# Patient Record
Sex: FEMALE | URBAN - METROPOLITAN AREA
[De-identification: names, ages, dates, MRNs, and addresses within clinical notes are randomized per-mention and may not be internally consistent; named-entity substitution may affect disease eponyms.]

---

## 2022-10-11 ENCOUNTER — WEB ENCOUNTER (OUTPATIENT)
Dept: URBAN - METROPOLITAN AREA CLINIC 96 | Facility: CLINIC | Age: 27
End: 2022-10-11

## 2022-10-12 ENCOUNTER — OFFICE VISIT (OUTPATIENT)
Dept: URBAN - METROPOLITAN AREA CLINIC 96 | Facility: CLINIC | Age: 27
End: 2022-10-12
Payer: COMMERCIAL

## 2022-10-12 ENCOUNTER — DASHBOARD ENCOUNTERS (OUTPATIENT)
Age: 27
End: 2022-10-12

## 2022-10-12 ENCOUNTER — LAB OUTSIDE AN ENCOUNTER (OUTPATIENT)
Dept: URBAN - METROPOLITAN AREA CLINIC 96 | Facility: CLINIC | Age: 27
End: 2022-10-12

## 2022-10-12 VITALS
TEMPERATURE: 98.3 F | SYSTOLIC BLOOD PRESSURE: 142 MMHG | BODY MASS INDEX: 44.41 KG/M2 | HEIGHT: 68 IN | HEART RATE: 101 BPM | DIASTOLIC BLOOD PRESSURE: 99 MMHG | WEIGHT: 293 LBS

## 2022-10-12 DIAGNOSIS — R11.2 NAUSEA & VOMITING: ICD-10-CM

## 2022-10-12 DIAGNOSIS — K21.9 GERD (GASTROESOPHAGEAL REFLUX DISEASE): ICD-10-CM

## 2022-10-12 DIAGNOSIS — R13.10 DYSPHAGIA: ICD-10-CM

## 2022-10-12 PROBLEM — 40739000 DYSPHAGIA: Status: ACTIVE | Noted: 2022-10-12

## 2022-10-12 PROBLEM — 235595009 GASTROESOPHAGEAL REFLUX DISEASE: Status: ACTIVE | Noted: 2022-10-12

## 2022-10-12 PROCEDURE — 99204 OFFICE O/P NEW MOD 45 MIN: CPT | Performed by: INTERNAL MEDICINE

## 2022-10-12 RX ORDER — PANTOPRAZOLE SODIUM 40 MG/1
1 TABLET TABLET, DELAYED RELEASE ORAL ONCE A DAY
Qty: 30 TABLET | Refills: 11 | OUTPATIENT
Start: 2022-10-12

## 2022-10-12 NOTE — HPI-TODAY'S VISIT:
Pt is referred by Chao . Today on 10/12/2022, pt reports that she is here for sxs of GERD.  She has chronic burning sensation in epigastric and abdominal area.  She has some dysphagia sxs to certain foods (greasy or heavy foods).  Her heartburn has been present for several years, controlled on ranitidine (stopped due recall).  She then changed to zantac.  She then started on protonix since July, which has nearly resolved her sxs.  Rare aleve.  Occasional nausea.  Some diarrhea with her metformin.  Trulicity can cause nausea with increased salivation.

## 2025-04-29 ENCOUNTER — LAB OUTSIDE AN ENCOUNTER (OUTPATIENT)
Dept: URBAN - METROPOLITAN AREA CLINIC 96 | Facility: CLINIC | Age: 30
End: 2025-04-29

## 2025-04-29 ENCOUNTER — OFFICE VISIT (OUTPATIENT)
Dept: URBAN - METROPOLITAN AREA CLINIC 96 | Facility: CLINIC | Age: 30
End: 2025-04-29
Payer: COMMERCIAL

## 2025-04-29 DIAGNOSIS — K21.9 CHRONIC GERD: ICD-10-CM

## 2025-04-29 DIAGNOSIS — R19.7 DIARRHEA IN ADULT PATIENT: ICD-10-CM

## 2025-04-29 DIAGNOSIS — R11.2 NAUSEA & VOMITING: ICD-10-CM

## 2025-04-29 DIAGNOSIS — K21.9 GERD (GASTROESOPHAGEAL REFLUX DISEASE): ICD-10-CM

## 2025-04-29 PROCEDURE — 99214 OFFICE O/P EST MOD 30 MIN: CPT | Performed by: INTERNAL MEDICINE

## 2025-04-29 RX ORDER — HYDROXYCHLOROQUINE SULFATE 200 MG/1
TAKE TWO TABLETS BY MOUTH DAILY TABLET, FILM COATED ORAL
Qty: 60 EACH | Refills: 5 | Status: ACTIVE | COMMUNITY

## 2025-04-29 RX ORDER — VORTIOXETINE 20 MG/1
TABLET, FILM COATED ORAL
Qty: 30 TABLET | Status: ACTIVE | COMMUNITY

## 2025-04-29 RX ORDER — PANTOPRAZOLE SODIUM 40 MG/1
1 TABLET TABLET, DELAYED RELEASE ORAL ONCE A DAY
Qty: 30 TABLET | Refills: 11 | Status: ACTIVE | COMMUNITY
Start: 2022-10-12

## 2025-04-29 RX ORDER — ATORVASTATIN CALCIUM 10 MG/1
TABLET, FILM COATED ORAL
Qty: 30 TABLET | Status: ACTIVE | COMMUNITY

## 2025-04-29 RX ORDER — METFORMIN HYDROCHLORIDE 500 MG/1
TABLET ORAL
Qty: 60 TABLET | Status: ACTIVE | COMMUNITY

## 2025-04-29 RX ORDER — INSULIN LISPRO 100 [IU]/ML
INJECTION, SOLUTION INTRAVENOUS; SUBCUTANEOUS
Qty: 70 MILLILITER | Status: ACTIVE | COMMUNITY

## 2025-04-29 RX ORDER — PANTOPRAZOLE SODIUM 40 MG/1
1 TABLET TABLET, DELAYED RELEASE ORAL ONCE A DAY
Qty: 30 TABLET | Refills: 11 | OUTPATIENT
Start: 2025-04-29

## 2025-04-29 NOTE — HPI-TODAY'S VISIT:
Pt is referred by Chao . Prev on 10/12/2022, pt reports that she is here for sxs of GERD.  She has chronic burning sensation in epigastric and abdominal area.  She has some dysphagia sxs to certain foods (greasy or heavy foods).  Her heartburn has been present for several years, controlled on ranitidine (stopped due recall).  She then changed to zantac.  She then started on protonix since July, which has nearly resolved her sxs.  Rare aleve.  Occasional nausea.  Some diarrhea with her metformin.  Trulicity can cause nausea with increased salivation. . Today on 4/29/2025, pt reports that she has gained a lot of weight, found to have high inflammatory markes and went to rheum.  Was dxd with inflammatory polyarthropathy.  They are concerned for potential IBD.  They started on hydroxychloroquine.  Has low grade fevers, malar rash.  Unable to tolerate GLP-1 meds (has tried several of them).  Going to see cardiology as well for POTS. .

## 2025-06-04 ENCOUNTER — WEB ENCOUNTER (OUTPATIENT)
Dept: URBAN - METROPOLITAN AREA CLINIC 96 | Facility: CLINIC | Age: 30
End: 2025-06-04

## 2025-06-24 ENCOUNTER — TELEPHONE ENCOUNTER (OUTPATIENT)
Dept: URBAN - METROPOLITAN AREA CLINIC 96 | Facility: CLINIC | Age: 30
End: 2025-06-24

## 2025-06-28 ENCOUNTER — WEB ENCOUNTER (OUTPATIENT)
Dept: URBAN - METROPOLITAN AREA CLINIC 96 | Facility: CLINIC | Age: 30
End: 2025-06-28

## 2025-06-30 ENCOUNTER — OFFICE VISIT (OUTPATIENT)
Dept: URBAN - METROPOLITAN AREA MEDICAL CENTER 28 | Facility: MEDICAL CENTER | Age: 30
End: 2025-06-30

## 2025-06-30 RX ORDER — METFORMIN HYDROCHLORIDE 500 MG/1
TABLET ORAL
Qty: 60 TABLET | Status: ACTIVE | COMMUNITY

## 2025-06-30 RX ORDER — VORTIOXETINE 20 MG/1
TABLET, FILM COATED ORAL
Qty: 30 TABLET | Status: ACTIVE | COMMUNITY

## 2025-06-30 RX ORDER — PANTOPRAZOLE SODIUM 40 MG/1
1 TABLET TABLET, DELAYED RELEASE ORAL ONCE A DAY
Qty: 30 TABLET | Refills: 11 | Status: ACTIVE | COMMUNITY
Start: 2022-10-12

## 2025-06-30 RX ORDER — HYDROXYCHLOROQUINE SULFATE 200 MG/1
TAKE TWO TABLETS BY MOUTH DAILY TABLET, FILM COATED ORAL
Qty: 60 EACH | Refills: 5 | Status: ACTIVE | COMMUNITY

## 2025-06-30 RX ORDER — INSULIN LISPRO 100 [IU]/ML
INJECTION, SOLUTION INTRAVENOUS; SUBCUTANEOUS
Qty: 70 MILLILITER | Status: ACTIVE | COMMUNITY

## 2025-06-30 RX ORDER — ATORVASTATIN CALCIUM 10 MG/1
TABLET, FILM COATED ORAL
Qty: 30 TABLET | Status: ACTIVE | COMMUNITY

## 2025-06-30 RX ORDER — PANTOPRAZOLE SODIUM 40 MG/1
1 TABLET TABLET, DELAYED RELEASE ORAL ONCE A DAY
Qty: 30 TABLET | Refills: 11 | OUTPATIENT
Start: 2025-06-30

## 2025-06-30 RX ORDER — PANTOPRAZOLE SODIUM 40 MG/1
1 TABLET TABLET, DELAYED RELEASE ORAL ONCE A DAY
Qty: 30 TABLET | Refills: 11 | Status: ACTIVE | COMMUNITY
Start: 2025-04-29